# Patient Record
Sex: FEMALE
[De-identification: names, ages, dates, MRNs, and addresses within clinical notes are randomized per-mention and may not be internally consistent; named-entity substitution may affect disease eponyms.]

---

## 2023-02-07 ENCOUNTER — NURSE TRIAGE (OUTPATIENT)
Dept: OTHER | Facility: CLINIC | Age: 58
End: 2023-02-07

## 2023-02-07 NOTE — TELEPHONE ENCOUNTER
Location of patient: [place]    Received call from [place] at [facility] with Red Flag Complaint. Sarah Benítez MRN: [id number]    Provider: Rayna Primrose, NP    Subjective: Caller states \"I have had a headache for the past 2 weeks\"     Current Symptoms: Headache for the past two weeks. Rating pain as severe. Has tried numerous otc medications with no relief. Associated Symptoms:Headaches    Pain Severity: 9/10    Temperature: Denies fever    What has been tried: Ibuprofen, Aspirin, Tylenol, Benadryl, RSO,     Recommended disposition: Be seen in 1 hour    Care advice provided, patient verbalizes understanding; denies any other questions or concerns. Outcome:        This triage is a result of a call to the [facility] 258      Reason for Disposition   SEVERE headache (e.g., excruciating) and has had severe headaches before    Protocols used: Headache-ADULT-[place]